# Patient Record
Sex: MALE | Race: OTHER
[De-identification: names, ages, dates, MRNs, and addresses within clinical notes are randomized per-mention and may not be internally consistent; named-entity substitution may affect disease eponyms.]

---

## 2019-01-15 ENCOUNTER — HOSPITAL ENCOUNTER (OUTPATIENT)
Dept: HOSPITAL 91 - SDS | Age: 74
Discharge: HOME | End: 2019-01-15
Payer: COMMERCIAL

## 2019-01-15 ENCOUNTER — HOSPITAL ENCOUNTER (OUTPATIENT)
Dept: HOSPITAL 10 - SDS | Age: 74
Discharge: HOME | End: 2019-01-15
Attending: UROLOGY
Payer: COMMERCIAL

## 2019-01-15 VITALS — DIASTOLIC BLOOD PRESSURE: 62 MMHG | RESPIRATION RATE: 27 BRPM | SYSTOLIC BLOOD PRESSURE: 118 MMHG | HEART RATE: 60 BPM

## 2019-01-15 VITALS — SYSTOLIC BLOOD PRESSURE: 131 MMHG | HEART RATE: 72 BPM | DIASTOLIC BLOOD PRESSURE: 69 MMHG | RESPIRATION RATE: 23 BRPM

## 2019-01-15 VITALS — RESPIRATION RATE: 21 BRPM | DIASTOLIC BLOOD PRESSURE: 68 MMHG | SYSTOLIC BLOOD PRESSURE: 128 MMHG | HEART RATE: 68 BPM

## 2019-01-15 VITALS — HEART RATE: 72 BPM | RESPIRATION RATE: 20 BRPM | DIASTOLIC BLOOD PRESSURE: 67 MMHG | SYSTOLIC BLOOD PRESSURE: 126 MMHG

## 2019-01-15 VITALS — SYSTOLIC BLOOD PRESSURE: 116 MMHG | HEART RATE: 70 BPM | RESPIRATION RATE: 24 BRPM | DIASTOLIC BLOOD PRESSURE: 65 MMHG

## 2019-01-15 VITALS — DIASTOLIC BLOOD PRESSURE: 72 MMHG | HEART RATE: 70 BPM | RESPIRATION RATE: 24 BRPM | SYSTOLIC BLOOD PRESSURE: 124 MMHG

## 2019-01-15 VITALS — DIASTOLIC BLOOD PRESSURE: 65 MMHG | HEART RATE: 102 BPM | SYSTOLIC BLOOD PRESSURE: 141 MMHG | RESPIRATION RATE: 18 BRPM

## 2019-01-15 VITALS — HEART RATE: 69 BPM | DIASTOLIC BLOOD PRESSURE: 65 MMHG | RESPIRATION RATE: 22 BRPM | SYSTOLIC BLOOD PRESSURE: 110 MMHG

## 2019-01-15 VITALS — DIASTOLIC BLOOD PRESSURE: 67 MMHG | SYSTOLIC BLOOD PRESSURE: 128 MMHG | HEART RATE: 72 BPM | RESPIRATION RATE: 25 BRPM

## 2019-01-15 VITALS — SYSTOLIC BLOOD PRESSURE: 119 MMHG | DIASTOLIC BLOOD PRESSURE: 61 MMHG | RESPIRATION RATE: 23 BRPM | HEART RATE: 60 BPM

## 2019-01-15 VITALS — HEART RATE: 65 BPM | DIASTOLIC BLOOD PRESSURE: 63 MMHG | SYSTOLIC BLOOD PRESSURE: 136 MMHG | RESPIRATION RATE: 16 BRPM

## 2019-01-15 VITALS
WEIGHT: 143.52 LBS | WEIGHT: 143.52 LBS | HEIGHT: 62 IN | HEIGHT: 62 IN | BODY MASS INDEX: 26.41 KG/M2 | BODY MASS INDEX: 26.41 KG/M2

## 2019-01-15 VITALS — DIASTOLIC BLOOD PRESSURE: 56 MMHG | SYSTOLIC BLOOD PRESSURE: 114 MMHG | HEART RATE: 62 BPM | RESPIRATION RATE: 27 BRPM

## 2019-01-15 VITALS — HEART RATE: 72 BPM | SYSTOLIC BLOOD PRESSURE: 131 MMHG | RESPIRATION RATE: 23 BRPM | DIASTOLIC BLOOD PRESSURE: 66 MMHG

## 2019-01-15 DIAGNOSIS — N47.1: ICD-10-CM

## 2019-01-15 DIAGNOSIS — N48.1: Primary | ICD-10-CM

## 2019-01-15 LAB
ADD UMIC: YES
UR ASCORBIC ACID: NEGATIVE MG/DL
UR BACTERIA: (no result) /HPF
UR BILIRUBIN (DIP): NEGATIVE MG/DL
UR BLOOD (DIP): (no result) MG/DL
UR CLARITY: (no result)
UR COLOR: YELLOW
UR GLUCOSE (DIP): NEGATIVE MG/DL
UR KETONES (DIP): NEGATIVE MG/DL
UR LEUKOCYTE ESTERASE (DIP): (no result) LEU/UL
UR MUCUS: (no result) /HPF
UR NITRITE (DIP): POSITIVE MG/DL
UR PH (DIP): 5 (ref 5–9)
UR RBC: 15 /HPF (ref 0–5)
UR SPECIFIC GRAVITY (DIP): 1.02 (ref 1–1.03)
UR SQUAMOUS EPITHELIAL CELL: (no result) /HPF
UR TOTAL PROTEIN (DIP): (no result) MG/DL
UR UROBILINOGEN (DIP): NEGATIVE MG/DL
UR WBC: > 182 /HPF (ref 0–5)

## 2019-01-15 PROCEDURE — 54161 CIRCUM 28 DAYS OR OLDER: CPT

## 2019-01-15 PROCEDURE — 88304 TISSUE EXAM BY PATHOLOGIST: CPT

## 2019-01-15 PROCEDURE — 87086 URINE CULTURE/COLONY COUNT: CPT

## 2019-01-15 PROCEDURE — 81001 URINALYSIS AUTO W/SCOPE: CPT

## 2019-01-15 RX ADMIN — BUPIVACAINE HYDROCHLORIDE 1 ML: 5 INJECTION, SOLUTION EPIDURAL; INTRACAUDAL; PERINEURAL at 13:08

## 2019-01-15 RX ADMIN — FENTANYL CITRATE 1 MCG: 50 INJECTION, SOLUTION INTRAMUSCULAR; INTRAVENOUS at 14:29

## 2019-01-15 RX ADMIN — FENTANYL CITRATE 1 MCG: 50 INJECTION, SOLUTION INTRAMUSCULAR; INTRAVENOUS at 14:19

## 2019-01-15 RX ADMIN — SODIUM CHLORIDE PRN MCG: 9 INJECTION, SOLUTION INTRAVENOUS at 14:19

## 2019-01-15 RX ADMIN — SODIUM CHLORIDE PRN MCG: 9 INJECTION, SOLUTION INTRAVENOUS at 14:29

## 2019-01-15 RX ADMIN — LIDOCAINE HYDROCHLORIDE 1 ML: 20 INJECTION, SOLUTION INFILTRATION; PERINEURAL at 13:08

## 2019-01-15 RX ADMIN — HYDROCODONE BITARTRATE AND ACETAMINOPHEN 1 TAB: 5; 325 TABLET ORAL at 14:38

## 2019-01-15 NOTE — OPR
Date/Time of Note


Date/Time of Note


DATE: 1/15/19 


TIME: 14:16





Operative Report


Procedure Date:  Baljit 15, 2019


Preoperative Diagnosis


Severe phimosis and balanitis


Postoperative Diagnosis


Same


Operation/Procedure Performed


Circumcision


Surgeon


see signature line


Assistant


Scrub tech


Anesthesia Type:  MAC


Anesthesiologist:  JULIA RUIZ


Estimated Blood Loss:  0 - 10 ml's


Transfusion


   none


Specimen


Foreskin


Grafts/Implants


none


Complications


none


Pt Condition Post Procedure:  stable


Disposition:  PACU


Indications


Balanitis, phimosis and urinary retention.


Procedure Description


The patient was brought to the operating room.  Time out was done.  The patient 


was identified by his name, birth date and the procedure.  Patient was given 1 g


of ceftriaxone IV at the start of the procedure. The genital area was then 


prepped and draped in usual sterile manner.  I injected half percent Marcaine 


mixed with equal amount of lidocaine 2% around the base of the penis and around 


the foreskin at the level of the corona for local anesthesia.  At the same time 


the anesthesiologist gave him deep sedation.  A dorsal slit was first done so 


the foreskin can be retracted.  The penis was then painted again with Betadine 


solution.  The foreskin at the level of the corona was marked then incised.  The


foreskin was then retracted and another incision was made one centimeter 


proximal to the corona.  The skin between the 2 incisions was removed.  All the 


bleeders were electrocoagulated and good hemostasis was obtained.  The 


subcutaneous tissue was approximated with 3-0 Vicryl interrupted sutures at the 


9,12, 3 and 6 o'clock position.  The incision was then closed with  3-0 and 4 -0


Vicryl interrupted sutures. The incision was then covered was a Vaseline gauze 


and a Xander.  I then painted the area around the suprapubic tube with Betadine 


deflated the balloon of the old suprapubic tube and removed it and inserted a 


new 14 [place] catheter through the suprapubic tract.  I did send the urine for 


culture.  Did contact the suprapubic tube to a drainage bag.  Patient was 


transferred to the recovery room in stable and satisfactory condition.











GLENNA DELGADO MD            Baljit 15, 2019 14:23

## 2019-01-15 NOTE — PREAC
Date/Time of Note


Date/Time of Note


DATE: 1/15/19 


TIME: 12:34





Anesthesia Eval and Record


Evaluation


Time Pre-Procedure Interview


DATE: 1/15/19 


TIME: 12:34


Age


73


Sex


male


NPO:  8 hrs


Preoperative diagnosis


balanitis


Planned procedure


Circumcision





Past Medical History


Past Medical History:  Includes


Cardio:  HTN, Dyslipidemia, MI, CAD, CABG, Arrythmia, CHF


Musculoskeletal:  Osteoarthritis


GI:  GERD


Heme:  Anemia





Surgery & Anesthesia Issues


No known issue





Meds


Anticoagulation:  No


Beta Blocker within 24 hr:  No


Reason Beta Blocker not given:  Pt. not on B-Blocker


Reported Medications


Tamsulosin Hcl* (Tamsulosin Hcl*) 0.4 Mg Cap.er.24h, 0.4 MG PO HS, CAP


   1/15/19


Lisinopril* (Lisinopril*) 10 Mg Tablet, 10 MG PO DAILY, #30 TAB


   1/15/19


Finasteride* (Finasteride*) 5 Mg Tablet, 5 MG PO DAILY, TAB


   1/15/19


Ferrous Sulfate* (Ferrous Sulfate*) 325 Mg Tabec, 325 MG PO DAILY, TAB


   1/15/19


Carvedilol* (Carvedilol*) 3.125 Mg Tablet, 3.125 MG PO BID, #60 TAB


   1/15/19


Atorvastatin Calcium* (Atorvastatin Calcium*) 20 Mg Tablet, 20 MG PO QHS, #30 


TAB


   1/15/19





Current Medications


Ceftriaxone Sodium 50 ml @  100 mls/hr PRE-OP IVPB ;  Start 1/15/19 at 11:30;  


Stop 1/15/19 at 15:00


Meds reviewed:  Yes





Allergies


Coded Allergies:  


     No Known Allergy (Unverified , 1/15/19)


Allergies Reviewed:  Yes





Labs/Studies


Labs Reviewed:  Reviewed by anesthesiologist


Pregnancy test:  Negative


Studies:  ECG





Pre-procedure Exam


Last vitals





Vital Signs


  Date      Temp  Pulse  Resp  B/P (MAP)   Pulse Ox  O2          O2 Flow    FiO2


Time                                                 Delivery    Rate


   1/15/19  97.3    102    18      141/65        98  Room Air


     11:19                           (90)





Airway:  Adequate mouth opening, Adequate thyromental dist


Mallampati:  Mallampati II


Teeth:  Normal


Lung:  Normal


Heart:  Normal





ASA Physical Status


ASA physical status:  3


Emergency:  None





Planned Anesthetic


General/MAC:  Mask, ETT, MAC





Pre-operative Attestations


Prior to commencing anesthesia and surgery, the patient was re-evaluated, there 


was verification of:


*The patient's identity


*The results of appropriate recent lab work and preoperative vital signs


*The above evaluation not changing prior to induction


*Anesthetic plan, risk benefits, alternative and complications discussed with 


patient/family; questions answered; patient/family understands, accepts and 


wishes to proceed.











JULIA RUIZ               Baljit 15, 2019 12:35
